# Patient Record
Sex: MALE | Race: WHITE | NOT HISPANIC OR LATINO | Employment: FULL TIME | URBAN - METROPOLITAN AREA
[De-identification: names, ages, dates, MRNs, and addresses within clinical notes are randomized per-mention and may not be internally consistent; named-entity substitution may affect disease eponyms.]

---

## 2017-04-12 ENCOUNTER — OFFICE VISIT - RIVER FALLS (OUTPATIENT)
Dept: FAMILY MEDICINE | Facility: CLINIC | Age: 37
End: 2017-04-12

## 2017-04-12 ASSESSMENT — MIFFLIN-ST. JEOR: SCORE: 1700.53

## 2017-05-20 ENCOUNTER — OFFICE VISIT - RIVER FALLS (OUTPATIENT)
Dept: FAMILY MEDICINE | Facility: CLINIC | Age: 37
End: 2017-05-20

## 2017-05-20 ASSESSMENT — MIFFLIN-ST. JEOR: SCORE: 1686.92

## 2017-10-17 ENCOUNTER — AMBULATORY - RIVER FALLS (OUTPATIENT)
Dept: FAMILY MEDICINE | Facility: CLINIC | Age: 37
End: 2017-10-17

## 2018-01-15 ENCOUNTER — OFFICE VISIT - RIVER FALLS (OUTPATIENT)
Dept: FAMILY MEDICINE | Facility: CLINIC | Age: 38
End: 2018-01-15

## 2019-10-02 ENCOUNTER — OFFICE VISIT - RIVER FALLS (OUTPATIENT)
Dept: FAMILY MEDICINE | Facility: CLINIC | Age: 39
End: 2019-10-02

## 2019-10-02 ASSESSMENT — MIFFLIN-ST. JEOR: SCORE: 1691.46

## 2019-10-15 LAB
ALBUMIN SERPL-MCNC: 4.8 G/DL
ALP SERPL-CCNC: 84 UNIT/L
ALT SERPL W P-5'-P-CCNC: 13 UNIT/L
AST SERPL W P-5'-P-CCNC: 20 UNIT/L
BILIRUB SERPL-MCNC: 0.86 MG/DL
BUN SERPL-MCNC: 14 MG/DL
CHOLEST SERPL-MCNC: 144 MG/DL
CREAT SERPL-MCNC: 1.18 MG/DL
CREAT UR-MCNC: 440 MG/DL
GGT SERPL-CCNC: 14 IU/L
GLOBULIN: 2.2 G/DL
GLUCOSE BLD-MCNC: 71 MG/DL
GLUCOSE, UR.: NEGATIVE
HBA1C MFR BLD: 5 %
HDLC SERPL-MCNC: 51.1 MG/DL
HGB UR QL STRIP: NEGATIVE
LDLC SERPL CALC-MCNC: 81 MG/DL
Lab: NEGATIVE
PROT SERPL-MCNC: 7 GM/DL
PROT/CREAT UR: 38 MG/G{CREAT}
SP GR UR STRIP: 1.07
TRIGL SERPL-MCNC: 57 MG/DL

## 2019-10-22 ENCOUNTER — COMMUNICATION - RIVER FALLS (OUTPATIENT)
Dept: FAMILY MEDICINE | Facility: CLINIC | Age: 39
End: 2019-10-22

## 2019-11-30 ENCOUNTER — OFFICE VISIT - RIVER FALLS (OUTPATIENT)
Dept: FAMILY MEDICINE | Facility: CLINIC | Age: 39
End: 2019-11-30

## 2019-11-30 ASSESSMENT — MIFFLIN-ST. JEOR: SCORE: 1685.11

## 2020-03-12 ENCOUNTER — COMMUNICATION - RIVER FALLS (OUTPATIENT)
Dept: FAMILY MEDICINE | Facility: CLINIC | Age: 40
End: 2020-03-12

## 2020-07-06 ENCOUNTER — OFFICE VISIT - RIVER FALLS (OUTPATIENT)
Dept: FAMILY MEDICINE | Facility: CLINIC | Age: 40
End: 2020-07-06

## 2020-07-06 ENCOUNTER — AMBULATORY - RIVER FALLS (OUTPATIENT)
Dept: FAMILY MEDICINE | Facility: CLINIC | Age: 40
End: 2020-07-06

## 2020-07-16 ENCOUNTER — COMMUNICATION - RIVER FALLS (OUTPATIENT)
Dept: FAMILY MEDICINE | Facility: CLINIC | Age: 40
End: 2020-07-16

## 2020-07-16 LAB — SARS-COV-2 RNA SPEC QL NAA+PROBE: NOT DETECTED

## 2020-11-25 ENCOUNTER — AMBULATORY - RIVER FALLS (OUTPATIENT)
Dept: FAMILY MEDICINE | Facility: CLINIC | Age: 40
End: 2020-11-25

## 2021-09-16 ENCOUNTER — COMMUNICATION - RIVER FALLS (OUTPATIENT)
Dept: FAMILY MEDICINE | Facility: CLINIC | Age: 41
End: 2021-09-16

## 2021-12-23 ENCOUNTER — AMBULATORY - RIVER FALLS (OUTPATIENT)
Dept: FAMILY MEDICINE | Facility: CLINIC | Age: 41
End: 2021-12-23

## 2022-02-11 VITALS
WEIGHT: 175.8 LBS | TEMPERATURE: 98 F | HEIGHT: 68 IN | SYSTOLIC BLOOD PRESSURE: 134 MMHG | SYSTOLIC BLOOD PRESSURE: 128 MMHG | TEMPERATURE: 97.4 F | HEART RATE: 56 BPM | DIASTOLIC BLOOD PRESSURE: 78 MMHG | HEART RATE: 62 BPM | DIASTOLIC BLOOD PRESSURE: 72 MMHG | BODY MASS INDEX: 26.64 KG/M2 | HEIGHT: 68 IN | BODY MASS INDEX: 27.1 KG/M2 | WEIGHT: 178.8 LBS

## 2022-02-11 VITALS
OXYGEN SATURATION: 98 % | DIASTOLIC BLOOD PRESSURE: 62 MMHG | TEMPERATURE: 97.9 F | HEART RATE: 64 BPM | WEIGHT: 175.4 LBS | SYSTOLIC BLOOD PRESSURE: 112 MMHG | HEIGHT: 68 IN | BODY MASS INDEX: 26.58 KG/M2

## 2022-02-11 VITALS
DIASTOLIC BLOOD PRESSURE: 81 MMHG | WEIGHT: 176.8 LBS | HEIGHT: 68 IN | SYSTOLIC BLOOD PRESSURE: 144 MMHG | BODY MASS INDEX: 26.8 KG/M2 | HEART RATE: 61 BPM | TEMPERATURE: 97.4 F

## 2022-02-11 VITALS
BODY MASS INDEX: 26.79 KG/M2 | TEMPERATURE: 97.8 F | SYSTOLIC BLOOD PRESSURE: 134 MMHG | WEIGHT: 176.2 LBS | DIASTOLIC BLOOD PRESSURE: 86 MMHG | HEART RATE: 66 BPM

## 2022-02-16 NOTE — PROGRESS NOTES
Chief Complaint    c/o sinus pressure/nasal congestion, cough with mucus  History of Present Illness      Patient is here with several week history of sinus pressure, nasal congestion, mild cough, and fatigue.  He denies fever, chills, sweats.  Review of Systems          ROS reviewed an negative except for symptoms noted in HPI.            Physical Exam   Vitals & Measurements    T: 97.9   F (Tympanic)  HR: 64(Peripheral)  BP: 112/62  SpO2: 98%     HT: 68 in  WT: 175.4 lb  BMI: 26.67           General:  Alert and oriented, No acute distress.            Eye:  Normal conjunctiva.            HENT:  Normocephalic, Tympanic membranes are clear, Oral mucosa is moist, No pharyngeal erythema.                 Sinus: bilateral maxillary tenderness               Throat: Pharynx ( posterior drainage ).            Neck:  Supple, Non-tender, No lymphadenopathy.            Respiratory:  Lungs are clear to auscultation, Respirations are non-labored.            Cardiovascular:  Normal rate, Regular rhythm.            Integumentary:  Warm, Dry.            Psychiatric:  Cooperative, Appropriate mood & affect.   Assessment/Plan       1. Acute maxillary sinusitis, unspecified (J01.00)         treat with doxycycline for 10 days        Analgesics and antipyretics as needed, symptomatic care, and follow up if not improving           Patient Information     Name:REJI ROMERO      Address:      [address] [id number]     Sex:Male     YOB: 1980     [phone]     Emergency Contact:NARCISA ROMERO     MRN:[id number]     FIN:[id number]     Location:[facility]     Date of Service:11/30/2019      Primary Care Physician:       Cullen Johnson MD, [phone]      Attending Physician:       Willian Bauer MD, [phone]  Problem List/Past Medical History    Ongoing     Epistaxis       Comments: Occasional epistaxis during the winter months.    Historical     Allergic eye  reaction       Comments: Uncertain cause.     Benign positional vertigo     Brain concussion       Comments: Patient slipped on ice, hit the back of his head. Suffered concussion/closed head injury. Also sprained neck.     Fracture, tibia       Comments: Nondisplaced fracture of the distal right tibia.     Right wrist sprain       Comments: Softball injury. Right wrist.  Procedure/Surgical History     Oral surgery      Comments: He was missing a couple of teeth congenitally.. Teeth implants..  Medications    doxycycline monohydrate 100 mg oral capsule, 100 mg= 1 cap(s), Oral, bid  Allergies    penicillins (hives)  Social History    Smoking Status - 11/30/2019     Never smoker     Alcohol      Current, Beer (12 oz), 1-2 times per month, 2 drinks/episode average. 3 drinks/episode maximum. Ready to change: No., 10/04/2019     Employment/School      Employed, Work/School description: Banker. Highest education level: University degree(s)., 10/04/2019     Exercise      Exercise frequency: 1-2 times/week. Exercise type: Sports, Yard Activities., 10/04/2019     Home/Environment      Marital status: . Spouse/Partner name: Mariama Fischer. Living situation: Home/Independent. Injuries/Abuse/Neglect in household: No. Feels unsafe at home: No. Family/Friends available for support: Yes., 10/04/2019     Nutrition/Health      Type of diet: Regular. Wants to lose weight: No. Sleeping concerns: No. Feels highly stressed: No., 10/04/2019     Sexual      Sexually active: Yes. Identifies as male, Sexual orientation: Straight or heterosexual. History of STD: No. Contraceptive Use Details: wife has IUD. History of sexual abuse: No., 10/04/2019     Substance Abuse      Never, 08/26/2014     Tobacco      Never, 08/26/2014  Family History    Hypertension: Father.    Mother: History is negative    Brother: History is negative  Immunizations      Vaccine Date Status Comments      influenza virus vaccine, inactivated 10/02/2019 Given       influenza virus vaccine, inactivated 10/17/2017 Given      influenza virus vaccine, inactivated 11/30/2016 Given      tetanus/diphth/pertuss (Tdap) adult/adol 08/26/2014 Given      Td 12/09/2004 Recorded      Hep B 06/28/2000 Recorded      Hep B 11/16/1999 Recorded      Hep B 09/29/1999 Recorded      MMR (measles/mumps/rubella) 04/27/1993 Recorded

## 2022-02-16 NOTE — TELEPHONE ENCOUNTER
Entered by Dana Barrientos CMA on March 12, 2020 2:39:56 PM CDT  Called Curtis back let him know we can fax, mail, or leave at  for immunization record and  forms to  provider. Called back let us know which works best for them.      ---------------------  From: CURTIS FISCHER  To: [facility]  Sent: 03/12/2020 02:20 p.m. CDT  Subject: Child Health Records  I am trying to track down some vaccination information for my children, Simon and Sarthak Fischer. Is that something that can be added to my portal for viewing, or do I need to contact someone at [facility] to get the information?---------------------  From: Dana Barrientos CMA (Phone Messages Pool ([id number]_[facility]))   To: [facility] Message Pool ([id number]_[place] [facility] [place]);     Sent: 3/12/2020 4:13:55 PM CDT  Subject: FW: Child Health Records---------------------  From: Fay Schmitt ([facility] Message Pool ([id number]_[facility]))   To: CURTIS FISCHER    Sent: 3/13/2020 4:50:35 PM CDT  Subject: FW: Child Health Records     Briana Acevedo,     I completed  forms and printed vaccination records for both girls.   It's at the  ready for . We are open all weekend from 8-5p if you would like to stop by.     I hope you have a great weekend!    Albaro ROYAL,

## 2022-02-16 NOTE — TELEPHONE ENCOUNTER
---------------------  From: Caity Wang   Sent: 7/6/2020 3:36:10 PM CDT  Subject: Curbside Testing     Patient was in for curbside testing. Per . O2 sat=98%. Specimen sent to [facility] lab. Forms faxed to [facility].

## 2022-02-16 NOTE — TELEPHONE ENCOUNTER
---------------------  From: Trinity Fragoso ([facility] [place] Message Pool ([id number]))   To: [facility] [place] Message [facility] ([id number]_[place] [facility] [place]);     Sent: 10/22/2019 3:52:17 PM CDT  Subject: Result: Ultrasound     Left message for patient to call back at: 3:50pm regarding recent ultrasound  Per TFS, Ultrasound shows a small benign cyst.  No follow up is needed.Patient had left a portal message @ [phone] that he received a message about test results. Patient notified via portal of this message.

## 2022-02-16 NOTE — TELEPHONE ENCOUNTER
---------------------  From: Siena Gonzales CMA   To: Appointment Pool ([id number]_[place] [facility] [place]);     Sent: 7/6/2020 10:42:14 AM CDT !  Subject: General Message     Please set pt up for covid 19 testing todaySCHEDULED

## 2022-02-16 NOTE — PROGRESS NOTES
Patient:   REJI ROMERO            MRN: [id number]            FIN: [id number]               Age:   37 years     Sex:  Male     :  1980   Associated Diagnoses:   None   Author:   Cullen Johnson MD      Chief Complaint   1/15/2018 1:02 PM CST    On/off sinus pressure and teeth pain.  Fever, upset stomach, congestion over the weekend.        History of Present Illness             The patient presents with facial pain.  The location is both sides of the face.  The duration is 28  days.  There were exacerbating factors including coughing.  The symptom occurs constantly.  The course is worsening.               The patient presents with nasal congestion.  The location is both sides.     It is described as a moderate amount and green in color.        Review of Systems   Constitutional:  No fever, No chills, No sweats.    Eye:  Negative.    Ear/Nose/Mouth/Throat:  Nasal congestion, Sinus pain, Sore throat.         Nasal discharge: Large amount, Green, Yellow.         Nasal obstruction: Bilateral.    Respiratory:  Cough.       Health Status   Allergies:    Allergic Reactions (Selected)  Severity Not Documented  Penicillins (Hives)   Medications:  (Selected)   Prescriptions  Prescribed  Flonase 50 mcg/inh nasal spray: 2 spray(s), Nasal, Daily, # 1 EA, 6 Refill(s), Type: Maintenance, Pharmacy: [facility], 2 spray(s) nasal daily  Levaquin 500 mg oral tablet: 1 tab(s) ( 500 mg ), PO, q24hr, # 10 tab(s), 0 Refill(s), Type: Maintenance, Pharmacy: [facility], 1 tab(s) po q 24 hrs,x10 day(s)  Documented Medications  Documented  ibuprofen: ( 200 mg ), po, prn, 0 Refill(s), Type: Maintenance   Problem list:    All Problems  Inactive: Epistaxis / SNOMED CT 3873958688  Resolved: Benign positional vertigo / SNOMED CT 197417430  Resolved: Brain concussion / SNOMED CT 764171826  Resolved: Fracture, tibia / SNOMED CT 68280273  Resolved: Allergic eye reaction / SNOMED CT 789060007  Resolved: Right wrist sprain / SNOMED CT  606122115      Histories   Past Medical History:    Resolved  Brain concussion (013492132): Onset on 1/14/2010 at 29 years.  Resolved.  Comments:  7/16/2012 CDT 2:50 PM CDT - Candy Cesar  Patient slipped on ice, hit the back of his head.  Suffered concussion/closed head injury.  Also sprained neck.  Right wrist sprain (483972806): Onset on 5/1/2008 at 27 years.  Resolved.  Comments:  7/16/2012 CDT 2:52 PM CDT - Candy Cesar  Softball injury.  Right wrist.  Benign positional vertigo (962694502): Onset on 8/23/2005 at 25 years.  Resolved.  Allergic eye reaction (376135985): Onset on 11/3/2003 at 23 years.  Resolved.  Comments:  7/16/2012 CDT 2:57 PM CDT Candy Francis  Uncertain cause.  Fracture, tibia (93270648): Onset on 6/30/1998 at 17 years.  Resolved.  Comments:  7/16/2012 CDT 2:45 PM CDT - Candy Cesar  Nondisplaced fracture of the distal right tibia.   Family History:    Hypertension  Father (Chad)     Procedure history:    Oral surgery (SNOMED CT 9470363618).  Comments:  7/16/2012 2:55 PM - Candy Cesar  He was missing a couple of teeth congenitally.    7/16/2012 2:47 PM - Candy Cesar  Teeth implants.   Social History:        Alcohol Assessment            Current, 0-2 times per week, 1 drinks/episode average.  2 drinks/episode maximum.      Tobacco Assessment            Never      Substance Abuse Assessment            Never      Employment and Education Assessment            Employed, Work/School description: Banker.      Home and Environment Assessment            Marital status: .  Spouse/Partner name: Mariama Valadeztz.      Nutrition and Health Assessment            Type of diet: Regular.      Exercise and Physical Activity Assessment            Exercise frequency: 2-3 times/week.  Exercise type: Walking, Sports.      Sexual Assessment            Sexually active: Yes.  Sexual orientation: Heterosexual.  Contraceptive Use Details: Condoms.  ,        Alcohol Assessment            Current, 0-2 times per week,  1 drinks/episode average.  2 drinks/episode maximum.      Tobacco Assessment            Never      Substance Abuse Assessment            Never      Employment and Education Assessment            Employed, Work/School description: Banker.      Home and Environment Assessment            Marital status: .  Spouse/Partner name: Mariama Fischer.      Nutrition and Health Assessment            Type of diet: Regular.      Exercise and Physical Activity Assessment            Exercise frequency: 2-3 times/week.  Exercise type: Walking, Sports.      Sexual Assessment            Sexually active: Yes.  Sexual orientation: Heterosexual.  Contraceptive Use Details: Condoms.        Physical Examination   Vital Signs   1/15/2018 1:02 PM CST Temperature Tympanic 97.8 DegF  LOW    Peripheral Pulse Rate 66 bpm    HR Method Electronic    Systolic Blood Pressure 134 mmHg  HI    Diastolic Blood Pressure 86 mmHg  HI    Mean Arterial Pressure 102 mmHg    BP Method Electronic      Measurements from flowsheet : Measurements   1/15/2018 1:02 PM CST    Weight Measured - Standard                176.2 lb     General:  Alert and oriented, No acute distress.    Eye:  Normal conjunctiva.    HENT:  Normocephalic, Tympanic membranes are clear.         Ear: Both ears, Within normal limits.         Nose: Both nostrils, Patent, Discharge ( Moderate amount, Green ), Turbinates ( Boggy, Erythematous ).         Throat: Tonsils ( Erythematous ), Pharynx ( Erythematous ).    Neck:  Supple, Non-tender, No lymphadenopathy.    Respiratory:  Lungs are clear to auscultation.       Impression and Plan   Diagnosis   Course:  Worsening.    Orders     Orders (Selected)   Outpatient Orders  Ordered  Influenza A and B Antigen (Request): Priority: Urgent, Cough  Physical Therapy (Request): Instructions: Evaluate and Treat  Physical Therapy (Request): Instructions: eval and treat, Chronic left shoulder pain  Prescriptions  Prescribed  Flonase 50 mcg/inh nasal  spray: 2 spray(s), Nasal, Daily, # 1 EA, 6 Refill(s), Type: Maintenance, Pharmacy: [facility], 2 spray(s) nasal daily  Levaquin 500 mg oral tablet: 1 tab(s) ( 500 mg ), PO, q24hr, # 10 tab(s), 0 Refill(s), Type: Maintenance, Pharmacy: [facility], 1 tab(s) po q 24 hrs,x10 day(s)  Documented Medications  Documented  ibuprofen: ( 200 mg ), po, prn, 0 Refill(s), Type: Maintenance.     Plan:       Follow-up: With Primary Care Provider, As needed or sooner if symptoms worsen.    Counseled:  Patient, Regarding diagnosis, Regarding treatment, Regarding medications.    Patient Instructions:  Launch follow-up (if licensed).

## 2022-02-16 NOTE — NURSING NOTE
Comprehensive Intake Entered On:  7/6/2020 10:18 AM CDT    Performed On:  7/6/2020 10:16 AM CDT by Siena Gonzales CMA               Summary   Chief Complaint :   c/o sneezing, scratchy nose and throat with low grade fever. Symptoms started yesterday. deneis body aches fatigue, or direct contact. verbal consent given for video visit   Menstrual Status :   N/A   Race :      Languages :   English   Ethnicity :   Not  or    Siena Gonzales CMA - 7/6/2020 10:16 AM CDT   Health Status   Allergies Verified? :   Yes   Medication History Verified? :   Yes   Pre-Visit Planning Status :   Not completed   Tobacco Use? :   Never smoker   Siena Gonzales CMA - 7/6/2020 10:16 AM CDT   Consents   Consent for Immunization Exchange :   Consent Granted   Consent for Immunizations to Providers :   Consent Granted   Siena Gonzales CMA - 7/6/2020 10:16 AM CDT   Meds / Allergies   (As Of: 7/6/2020 10:18:04 AM CDT)   Allergies (Active)   penicillins  Estimated Onset Date:   Unspecified ; Reactions:   hives ; Created By:   Marry Willingham CMA; Reaction Status:   Active ; Category:   Drug ; Substance:   penicillins ; Type:   Allergy ; Updated By:   Marry Willingham CMA; Reviewed Date:   11/30/2019 8:53 AM CST        Medication List   (As Of: 7/6/2020 10:18:04 AM CDT)   Prescription/Discharge Order    doxycycline  :   doxycycline ; Status:   Processing ; Ordered As Mnemonic:   doxycycline monohydrate 100 mg oral capsule ; Ordering Provider:   Willian Bauer MD; Action Display:   Complete ; Catalog Code:   doxycycline ; Order Dt/Tm:   7/6/2020 10:17:41 AM CDT            ID Risk Screen   Recent Travel History :   No recent travel   Family Member Travel History :   No recent travel   Other Exposure to Infectious Disease :   Unknown   Siena Gonzales CMA - 7/6/2020 10:16 AM CDT

## 2022-02-16 NOTE — NURSING NOTE
Comprehensive Intake Entered On:  10/2/2019 4:08 PM CDT    Performed On:  10/2/2019 4:07 PM CDT by Trinity Fragoso               Summary   Chief Complaint :   Px   Menstrual Status :   N/A   Weight Measured :   176.8 lb(Converted to: 176 lb 13 oz, 80.20 kg)    Height Measured :   68 in(Converted to: 5 ft 8 in, 172.72 cm)    Body Mass Index :   26.88 kg/m2 (HI)    Body Surface Area :   1.96 m2   Systolic Blood Pressure :   144 mmHg (HI)    Diastolic Blood Pressure :   81 mmHg (HI)    Mean Arterial Pressure :   102 mmHg   Peripheral Pulse Rate :   61 bpm   BP Method :   Electronic   HR Method :   Electronic   Temperature Tympanic :   97.4 DegF(Converted to: 36.3 DegC)  (LOW)    Race :      Languages :   English   Ethnicity :   Not  or    Trinity Fragoso - 10/2/2019 4:07 PM CDT   Health Status   Allergies Verified? :   Yes   Medication History Verified? :   Yes   Pre-Visit Planning Status :   Completed   Tobacco Use? :   Never smoker   Trinity Fragoso - 10/2/2019 4:07 PM CDT   Meds / Allergies   (As Of: 10/2/2019 4:08:29 PM CDT)   Allergies (Active)   penicillins  Estimated Onset Date:   Unspecified ; Reactions:   hives ; Created By:   Marry Willingham CMA; Reaction Status:   Active ; Category:   Drug ; Substance:   penicillins ; Type:   Allergy ; Updated By:   Marry Willingham CMA; Reviewed Date:   5/20/2017 10:04 AM CDT        Medication List   (As Of: 10/2/2019 4:08:29 PM CDT)   Prescription/Discharge Order    levoFLOXacin  :   levoFLOXacin ; Status:   Completed ; Ordered As Mnemonic:   Levaquin 500 mg oral tablet ; Simple Display Line:   500 mg, 1 tab(s), PO, q24hr, for 10 day(s), 10 tab(s), 0 Refill(s) ; Ordering Provider:   Cullen Johnson MD; Catalog Code:   levoFLOXacin ; Order Dt/Tm:   2/27/2018 12:07:44 PM          fluticasone nasal  :   fluticasone nasal ; Status:   Processing ; Ordered As Mnemonic:   Flonase 50 mcg/inh nasal spray ; Ordering Provider:   Cullen Johnson MD;  Action Display:   Complete ; Catalog Code:   fluticasone nasal ; Order Dt/Tm:   10/2/2019 4:07:23 PM            Home Meds    ibuprofen  :   ibuprofen ; Status:   Processing ; Ordered As Mnemonic:   ibuprofen ; Action Display:   Complete ; Catalog Code:   ibuprofen ; Order Dt/Tm:   10/2/2019 4:07:23 PM

## 2022-02-16 NOTE — PROGRESS NOTES
Patient:   REJI ROMERO            MRN: [id number]            FIN: [id number]               Age:   39 years     Sex:  Male     :  1980   Associated Diagnoses:   Well adult exam; Lump in testis   Author:   Cullen Johnson MD      Visit Information      Date of Service: 10/02/2019 04:00 pm  Performing Location: [facility]  Encounter#: [id number]      Primary Care Provider (PCP):  Cullen Johnson MD    NPI# [id number]      Referring Provider:  Cullen Johnson MD# [id number]      Chief Complaint   10/2/2019 4:07 PM CDT    Px     Routine Health Care Visit      History of Present Illness   Doing well Sees chiropractor for neck issues  bp usually wnl  notice lump on testicle             The patient presents for a general exam.  The patient's general health status is described as good.  The patient's diet is described as balanced.  Exercise: occasional.  Additional pertinent history: occasional caffeine use and tobacco use none.        Review of Systems   Constitutional:  Negative.    Eye:  Negative.    Ear/Nose/Mouth/Throat:  Negative.    Respiratory:  Negative.    Cardiovascular:  Negative.    Gastrointestinal:  Negative.    Genitourinary:  Negative.    Hematology/Lymphatics:  Negative.    Endocrine:  Negative.    Immunologic:  Negative.    Musculoskeletal:  Negative.    Integumentary:  Negative.    Neurologic:  Negative.    Psychiatric:  Negative.    All other systems reviewed and negative      Health Status   Allergies:    Allergic Reactions (Selected)  Severity Not Documented  Penicillins (Hives)   Medications:  (Selected)   ,    Medications          No medications documented     Problem list:    All Problems  Inactive: Epistaxis / SNOMED CT 2513117834  Resolved: Benign positional vertigo / SNOMED CT 499798748  Resolved: Brain concussion / SNOMED CT 338394296  Resolved: Fracture, tibia / SNOMED CT 19156326  Resolved: Allergic eye reaction / SNOMED CT 686800685  Resolved: Right  wrist sprain / SNOMED CT 838967264      Histories   Past Medical History:    Resolved  Brain concussion (791277215): Onset on 1/14/2010 at 29 years.  Resolved.  Comments:  7/16/2012 CDT 2:50 PM Candy Vallecillo  Patient slipped on ice, hit the back of his head.  Suffered concussion/closed head injury.  Also sprained neck.  Right wrist sprain (050396730): Onset on 5/1/2008 at 27 years.  Resolved.  Comments:  7/16/2012 CDT 2:52 PM [facility] Candy Francis  Softball injury.  Right wrist.  Benign positional vertigo (018747672): Onset on 8/23/2005 at 25 years.  Resolved.  Allergic eye reaction (857559946): Onset on 11/3/2003 at 23 years.  Resolved.  Comments:  7/16/2012 CDT 2:57 PM Candy Vallecillo  Uncertain cause.  Fracture, tibia (36371002): Onset on 6/30/1998 at 17 years.  Resolved.  Comments:  7/16/2012 CDT 2:45 PM Candy Vallecillo  Nondisplaced fracture of the distal right tibia.   Family History:    Mother: Deepti      History is negative.  Father: Chad    Hypertension  Brother    History is negative.     Procedure history:    Oral surgery (SNOMED CT 8885620052).  Comments:  7/16/2012 2:55 PM Candy Vallecillo  He was missing a couple of teeth congenitally.    7/16/2012 2:47 PM Candy Vallecillo  Teeth implants.   Social History:        Alcohol Assessment            Current, 0-2 times per week, 1 drinks/episode average.  2 drinks/episode maximum.      Tobacco Assessment            Never      Substance Abuse Assessment            Never      Employment and Education Assessment            Employed, Work/School description: Banker.      Home and Environment Assessment            Marital status: .  Spouse/Partner name: Mariama Amado.      Nutrition and Health Assessment            Type of diet: Regular.      Exercise and Physical Activity Assessment            Exercise frequency: 2-3 times/week.  Exercise type: Walking, Sports.      Sexual Assessment            Sexually active: Yes.  Sexual orientation:  Heterosexual.  Contraceptive Use Details: Condoms.        Physical Examination   Vital Signs   10/2/2019 4:07 PM CDT Temperature Tympanic 97.4 DegF  LOW    Peripheral Pulse Rate 61 bpm    HR Method Electronic    Systolic Blood Pressure 144 mmHg  HI    Diastolic Blood Pressure 81 mmHg  HI    Mean Arterial Pressure 102 mmHg    BP Method Electronic      Measurements from flowsheet : Measurements   10/2/2019 4:07 PM CDT Height Measured - Standard 68 in    Weight Measured - Standard 176.8 lb    BSA 1.96 m2    Body Mass Index 26.88 kg/m2  HI      General:  Alert and oriented.    Eye:  Pupils are equal, round and reactive to light, Normal conjunctiva.    HENT:  Normocephalic, Tympanic membranes are clear, Oral mucosa is moist.    Neck:  Supple, Non-tender, No lymphadenopathy, No thyromegaly.    Respiratory:  Breath sounds are equal, Symmetrical chest wall expansion.         Respirations: Are within normal limits.         Pattern: Regular.         Breath sounds: Bilateral, Within normal limits.    Cardiovascular:  Normal rate, Regular rhythm, No murmur, Good pulses equal in all extremities, Normal peripheral perfusion, No edema.    Gastrointestinal:  Soft, Non-tender, Non-distended, Normal bowel sounds.    Genitourinary:       Testes: Within normal limits.    Musculoskeletal:  No tenderness, No swelling.    Integumentary:  Warm, Dry.    Neurologic:  No focal deficits.    Cognition and Speech:  Oriented, Speech clear and coherent.    Psychiatric:  Appropriate mood & affect, Normal judgment.       Health Maintenance      Recommendations     Pending (in the next year)        OverDue           Alcohol Misuse Screen (Male) due  04/12/18  and every 1  year(s)           Depression Screen (Male) due  04/12/18  and every 1  year(s)           Influenza Vaccine due  09/01/19  and every 1  year(s)        Due            HIV Screen (if sexually active) (Male) due  10/02/19  and every 1  year(s)           Lipid Disorders Screen (Male) due   10/02/19  and every 1  year(s)           STD Counseling (if sexually active) (Male) due  10/02/19  and every 1  year(s)           Syphilis Screen (if sexually active) (Male) due  10/02/19  and every 1  year(s)           Type 2 Diabetes Mellitus Screen (Male) due  10/02/19  Variable frequency     Satisfied (in the past 1 year)        Satisfied            Body Mass Index Check (Male) on  10/02/19.           High Blood Pressure Screen (Male) on  10/02/19.           Tobacco Use Screen (Male) on  10/02/19.          Impression and Plan   Diagnosis     Well adult exam (VRQ25-FF Z00.00).     Lump in testis (BJJ13-OH N50.9).     Course:  Progressing as expected.    Plan:  hcm reviewed, htn reviewed  us of testicle  1 yr fu.    Patient Instructions:       Counseled: Patient, Diet, Activity, Verbalized understanding.    Counseled:  Patient, Routine exercise and healthy weight maintenance discussed.    Patient Instructions:  Return to clinic in one year for next routine health visit, or sooner if problems or concerns.

## 2022-02-16 NOTE — TELEPHONE ENCOUNTER
---------------------  From: Marco Barrera MD   To: REJI ROMERO    Sent: 7/16/2020 8:41:19 AM CDT  Subject: Patient Message - Results Notification        your results are negative    Results:  Date Result Name Value Ref Range   7/6/2020 11:35 AM Coronavirus SARS-CoV-2 (COVID-19) NOT DETECTED (NOT DETECTED - )

## 2022-02-16 NOTE — PROGRESS NOTES
Patient:   REJI ROMERO            MRN: [id number]            FIN: [id number]               Age:   36 years     Sex:  Male     :  1980   Associated Diagnoses:   Annual exam; Left thumb sprain   Author:   Cullen Johnson MD      Visit Information      Date of Service: 2017 03:58 pm  Performing Location: [facility]  Encounter#: [id number]      Primary Care Provider (PCP):  Cullen Johnson MD    NPI# [id number]      Referring Provider:  No referring provider recorded for selected visit.      Chief Complaint   2017 4:02 PM CDT    Px   Routine Health Care Visit      History of Present Illness   Doing well no concerns             The patient presents for a general exam, chief complaint and symptoms as noted above confirmed with patient .  The patient's general health status is described as good.  Exercise: routine.     sprined thumb bball last wiinter still sore      Review of Systems   Constitutional:  Negative.    Eye:  Negative.    Ear/Nose/Mouth/Throat:  Negative.    Respiratory:  Negative.    Cardiovascular:  Negative.    Gastrointestinal:  Negative.    Genitourinary:  Negative.    Hematology/Lymphatics:  Negative.    Endocrine:  Negative.    Immunologic:  Negative.    Musculoskeletal:  Negative except as documented in history of present illness.    Integumentary:  Negative except as documented in history of present illness.    Neurologic:  Negative.    Psychiatric:  Negative.    All other systems reviewed and negative      Health Status   Allergies:    Allergic Reactions (Selected)  No known allergies   Medications:  (Selected)   Documented Medications  Documented  ibuprofen: ( 200 mg ), po, prn, 0 Refill(s), Type: Maintenance   Problem list:    All Problems  Inactive: EPISTAXIS / ICD-9-.7  Resolved: Fracture, tibia / ICD-9-.80  Resolved: Brain Concussion / ICD-9-.9  Resolved: Right wrist sprain / ICD-9-.00  Resolved: Benign Positional Vertigo /  ICD-9-.11  Resolved: Allergic eye reaction / ICD-9-.99      Histories   Past Medical History:    Resolved  Brain Concussion (850.9): Onset on 1/14/2010 at 29 years.  Resolved.  Comments:  7/16/2012 CDT 2:50 PM CDT - Candy Cesar  Patient slipped on ice, hit the back of his head.  Suffered concussion/closed head injury.  Also sprained neck.  Right wrist sprain (842.00): Onset on 5/1/2008 at 27 years.  Resolved.  Comments:  7/16/2012 CDT 2:52 PM CDT - Candy Cesar  Softball injury.  Right wrist.  Benign Positional Vertigo (386.11): Onset on 8/23/2005 at 25 years.  Resolved.  Allergic eye reaction (379.99): Onset on 11/3/2003 at 23 years.  Resolved.  Comments:  7/16/2012 CDT 2:57 PM CDT - Candy Cesar  Uncertain cause.  Fracture, tibia (823.80): Onset on 6/30/1998 at 17 years.  Resolved.  Comments:  7/16/2012 CDT 2:45 PM CDT - Candy Cesar  Nondisplaced fracture of the distal right tibia.   Family History:    Mother: Deepti      History is negative.  Father: Chad    Hypertension  Brother    History is negative.     Procedure history:    Oral surgery (SNOMED CT 3645796961).  Comments:  7/16/2012 2:55 PM - Candy Cesar  He was missing a couple of teeth congenitally.    7/16/2012 2:47 PM - Candy Cesar  Teeth implants.   Social History:        Alcohol Assessment            Current                     Comments:                      07/16/2012 - Candy Cesar                     Occasional      Tobacco Assessment            Never      Substance Abuse Assessment            Never      Employment and Education Assessment            Employed, Work/School description: Banker.      Home and Environment Assessment            Marital status: .  Spouse/Partner name: Mariama Valadeztz.      Nutrition and Health Assessment            Type of diet: Regular.      Exercise and Physical Activity Assessment            Exercise frequency: 2-3 times/week.  Exercise type: Walking, Sports.      Sexual Assessment            Sexually  active: Yes.  Sexual orientation: Heterosexual.  Contraceptive Use Details: Condoms.        Physical Examination   Vital Signs   4/12/2017 4:02 PM CDT Temperature Tympanic 98.0 DegF    Peripheral Pulse Rate 62 bpm    Systolic Blood Pressure 134 mmHg    Diastolic Blood Pressure 78 mmHg    Mean Arterial Pressure 97 mmHg      Measurements from flowsheet : Measurements   4/12/2017 4:02 PM CDT Height Measured - Standard 68 in    Weight Measured - Standard 178.8 lb    BSA 1.97 m2    Body Mass Index 27.18 kg/m2      General:  Alert and oriented.    Eye:  Pupils are equal, round and reactive to light, Normal conjunctiva.    HENT:  Normocephalic, Tympanic membranes are clear, Oral mucosa is moist.    Neck:  Supple, Non-tender, No lymphadenopathy, No thyromegaly.    Respiratory:  Breath sounds are equal, Symmetrical chest wall expansion.         Respirations: Are within normal limits.         Pattern: Regular.         Breath sounds: Bilateral, Within normal limits.    Cardiovascular:  Normal rate, Regular rhythm, No murmur, Good pulses equal in all extremities, Normal peripheral perfusion, No edema.    Breast:  No mass.         Lymph nodes: Within normal limits.    Gastrointestinal:  Soft, Non-tender, Non-distended, Normal bowel sounds.    Musculoskeletal:       Upper extremity exam: Fingers ( Left, First finger, Metacarpal phalangeal, Tenderness, Normal range of motion ).    Integumentary:  Warm, Dry.    Neurologic:  No focal deficits.    Cognition and Speech:  Oriented, Speech clear and coherent.    Psychiatric:  Appropriate mood & affect, Normal judgment.       Health Maintenance      Recommendations     Pending (in the next year)        OverDue           Alcohol Misuse Screen (Male) due  08/26/15  and every 1  year(s)           Depression Screen (Male) due  08/26/15  and every 1  year(s)        Due            HIV Screen (if sexually active) (Male) due  04/12/17  and every 1  year(s)           STD Counseling (if sexually  active) (Male) due  04/12/17  and every 1  year(s)           Syphilis Screen (if sexually active) (Male) due  04/12/17  and every 1  year(s)     Satisfied (in the past 1 year)        Satisfied            Body Mass Index Check (Male) on  04/12/17.           High Blood Pressure Screen (Male) on  04/12/17.           Tobacco Use Screen (Male) on  04/12/17.        Review / Management   Radiology results   Reveals no acute disease process      Impression and Plan   Diagnosis     Annual exam (YLC19-OW Z00.00).     Left thumb sprain (COW51-RO S63.602A).     Plan:  declined splint.    Patient Instructions:       Counseled: Patient, Diet, Activity, Verbalized understanding.    Counseled:  Patient, Routine exercise and healthy weight maintenance discussed.    Patient Instructions:  Return to clinic in one year for next routine health visit, or sooner if problems or concerns.

## 2022-02-16 NOTE — TELEPHONE ENCOUNTER
Order faxed to [facility], patient is aware they will contact him to schedule.

## 2022-02-16 NOTE — NURSING NOTE
CAGE Assessment Entered On:  10/4/2019 12:49 PM CDT    Performed On:  10/2/2019 12:49 PM CDT by Jocelynn Quinn               Assessment   Have you ever felt you should cut down on your drinking :   No   Have people annoyed you by criticizing your drinking :   No   Have you ever felt bad or guilty about your drinking :   No   Have you ever taken a drink first thing in the morning to steady your nerves or get rid of a hangover (Eye-opener) :   No   CAGE Score :   0    Jocelynn Quinn - 10/4/2019 12:49 PM CDT

## 2022-02-16 NOTE — LETTER
(Inserted Image. Unable to display)         October 15, 2020        REJI ROMERO   [address] [place], [place] [id number]        Dear REJI,    Thank you for selecting [facility] for your healthcare needs.    Our records indicate you are due for the following services:     Annual Physical     To schedule an appointment or if you have further questions, please contact your primary clinic:   [facility]       [phone]   [facility]       [phone]              [facility]     [phone]      Powered by [employer]    Sincerely,    Cullen Johnson MD

## 2022-02-16 NOTE — NURSING NOTE
Comprehensive Intake Entered On:  11/30/2019 8:55 AM CST    Performed On:  11/30/2019 8:50 AM CST by Lu Butler CMA               Summary   Chief Complaint :   c/o sinus pressure/nasal congestion, cough with mucus    Menstrual Status :   N/A   Weight Measured :   175.4 lb(Converted to: 175 lb 6 oz, 79.56 kg)    Height Measured :   68 in(Converted to: 5 ft 8 in, 172.72 cm)    Body Mass Index :   26.67 kg/m2 (HI)    Body Surface Area :   1.95 m2   Systolic Blood Pressure :   112 mmHg   Diastolic Blood Pressure :   62 mmHg   Mean Arterial Pressure :   79 mmHg   Peripheral Pulse Rate :   64 bpm   BP Site :   Right arm   Pulse Site :   Radial artery   BP Method :   Manual   HR Method :   Manual   Temperature Tympanic :   97.9 DegF(Converted to: 36.6 DegC)    Oxygen Saturation :   98 %   Race :      Languages :   English   Ethnicity :   Not  or    Lu Butler CMA - 11/30/2019 8:50 AM CST   Health Status   Allergies Verified? :   Yes   Medication History Verified? :   Yes   Medical History Verified? :   No   Pre-Visit Planning Status :   Not completed   Tobacco Use? :   Never smoker   Lu Butler CMA - 11/30/2019 8:50 AM CST   Consents   Consent for Immunization Exchange :   Consent Granted   Consent for Immunizations to Providers :   Consent Granted   Lu Butler CMA - 11/30/2019 8:50 AM CST   Meds / Allergies   (As Of: 11/30/2019 8:55:34 AM CST)   Allergies (Active)   penicillins  Estimated Onset Date:   Unspecified ; Reactions:   hives ; Created By:   Marry Willingham CMA; Reaction Status:   Active ; Category:   Drug ; Substance:   penicillins ; Type:   Allergy ; Updated By:   Marry Willingham CMA; Reviewed Date:   11/30/2019 8:53 AM CST        Medication List   (As Of: 11/30/2019 8:55:34 AM CST)

## 2022-02-16 NOTE — PROGRESS NOTES
Chief Complaint    c/o sneezing, scratchy nose and throat with low grade fever. Symptoms started yesterday. deneis body aches fatigue, or direct contact. verbal consent given for video visit  History of Present Illness      39-year-old with video visit.  He was at home.  Patient has started with some fevers 101.  Sniffles scratchy nose and throat.  First he thought it was allergies but he has had increased temp today.  He has been good at viral pandemic precautions.  But he is ahead of [email or web address] and does come in contact with employees some people.  He has had one employee who he has having fever symptoms and had direct contact so they expect her to be positive otherwise no one of their employees have had problems and no one in his family has had symptoms  Review of Systems      No changes in taste, no shortness of breath, no rashes  Physical Exam      Alert and talkative no signs of distress  Assessment/Plan       1. Fever (R50.9)         He will be tested for the coronavirus.  I discussed false negative test and that since he has symptoms of low-grade fever he should remain in isolation until his symptoms have been clear for 3 days.  He plans on working from home this week.  Patient Information     Name:REJI ROMERO      Address:      [address] [id number]     Sex:Male     YOB: 1980     [phone]     Emergency Contact:NARCISA ROMERO     MRN:[id number]     FIN:[id number]     Location:[email or web address]     Date of Service:07/06/2020      Primary Care Physician:       Cullen Johnson MD, [phone]      Attending Physician:       Marco Barrera MD, [phone]  Problem List/Past Medical History    Ongoing     Epistaxis       Comments: Occasional epistaxis during the winter months.    Historical     Allergic eye reaction       Comments: Uncertain cause.     Benign positional vertigo     Brain concussion       Comments: Patient slipped on ice,  hit the back of his head. Suffered concussion/closed head injury. Also sprained neck.     Fracture, tibia       Comments: Nondisplaced fracture of the distal right tibia.     Right wrist sprain       Comments: Softball injury. Right wrist.  Procedure/Surgical History     Oral surgery      Comments: He was missing a couple of teeth congenitally.. Teeth implants..  Medications   No active medications  Allergies    penicillins (hives)  Social History    Smoking Status - 07/06/2020     Never smoker     Alcohol      Current, Beer (12 oz), 1-2 times per month, 2 drinks/episode average. 3 drinks/episode maximum. Ready to change: No., 10/04/2019     Employment/School      Employed, Work/School description: Banker. Highest education level: University degree(s)., 10/04/2019     Exercise      Exercise frequency: 1-2 times/week. Exercise type: Sports, Yard Activities., 10/04/2019     Home/Environment      Marital status: . Spouse/Partner name: Mariama Fischer. Living situation: Home/Independent. Injuries/Abuse/Neglect in household: No. Feels unsafe at home: No. Family/Friends available for support: Yes., 10/04/2019     Nutrition/Health      Type of diet: Regular. Wants to lose weight: No. Sleeping concerns: No. Feels highly stressed: No., 10/04/2019     Sexual      Sexually active: Yes. Identifies as male, Sexual orientation: Straight or heterosexual. History of STD: No. Contraceptive Use Details: wife has IUD. History of sexual abuse: No., 10/04/2019     Substance Abuse      Never, 08/26/2014     Tobacco      Never, 08/26/2014  Family History    Hypertension: Father.    Mother: History is negative    Brother: History is negative  Immunizations      Vaccine Date Status          influenza virus vaccine, inactivated 10/02/2019 Given          influenza virus vaccine, inactivated 10/17/2017 Given          influenza virus vaccine, inactivated 11/30/2016 Given          tetanus/diphth/pertuss (Tdap) adult/adol 08/26/2014 Given           Td 12/09/2004 Recorded          Hep B 06/28/2000 Recorded          Hep B 11/16/1999 Recorded          Hep B 09/29/1999 Recorded          MMR (measles/mumps/rubella) 04/27/1993 Recorded

## 2022-02-16 NOTE — PROGRESS NOTES
Patient:   REJI ROMERO            MRN: [id number]            FIN: [id number]               Age:   36 years     Sex:  Male     :  1980   Associated Diagnoses:   Allergic reaction   Author:   Dilia Harp      Chief Complaint   2017 8:32 AM CDT    Taking amoxicillin for 5 days. c/o rash on arms, feet, chest, back        History of Present Illness   PPC with itchy hive like rash which started yesterday and has worsened for about 12 hours then he took 25mg benadyl last night and another one this morning an ditching has improved  was treated for GABHS based on centor criteria, with amoxicillin, this finished about 5d ago.    denies swelling of mouth or tongue, no diffiuclty breathing, no wheezing  denies all other supplements, lotions, soaps, no new food.      Review of Systems   Constitutional:  Negative.    Eye:  Negative.    Ear/Nose/Mouth/Throat:  Negative.    Respiratory:  Negative.    Cardiovascular:  Negative.    Endocrine:  Negative.    Musculoskeletal:  Negative.    Integumentary:  Negative except as documented in history of present illness.    Neurologic:  Negative.    Psychiatric:  Negative.              Health Status   Allergies:    Allergic Reactions (Selected)  Severity Not Documented  Penicillins (Hives)   Medications:  (Selected)   Documented Medications  Documented  ibuprofen: ( 200 mg ), po, prn, 0 Refill(s), Type: Maintenance      Histories   Past Medical History:    Resolved  Brain concussion ([id number]): Onset on 2010 at 29 years.  Resolved.  Comments:  2012 CDT 2:50 PM CDT - Candy Cesar  Patient slipped on ice, hit the back of his head.  Suffered concussion/closed head injury.  Also sprained neck.  Right wrist sprain ([id number]): Onset on 2008 at 27 years.  Resolved.  Comments:  2012 CDT 2:52 PM CDT - Candy Cesar  Softball injury.  Right wrist.  Benign positional vertigo ([id number]): Onset on 2005 at 25 years.  Resolved.  Allergic eye reaction  ([id number]): Onset on 11/3/2003 at 23 years.  Resolved.  Comments:  7/16/2012 CDT 2:57 PM CDT - Candy Cesar  Uncertain cause.  Fracture, tibia ([id number]): Onset on 6/30/1998 at 17 years.  Resolved.  Comments:  7/16/2012 CDT 2:45 PM CDT - Candy Cesar  Nondisplaced fracture of the distal right tibia.   Family History:    Hypertension  Father (Chad)        Physical Examination   Vital Signs   5/20/2017 8:32 AM CDT Temperature Tympanic 97.4 DegF  LOW    Peripheral Pulse Rate 56 bpm  LOW    Pulse Site Radial artery    HR Method Manual    Systolic Blood Pressure 128 mmHg    Diastolic Blood Pressure 72 mmHg    Mean Arterial Pressure 91 mmHg    BP Site Right arm    BP Method Manual      General:  Alert and oriented, No acute distress.    Eye:  Pupils are equal, round and reactive to light.    HENT:  Normocephalic, Oral mucosa is moist, No pharyngeal erythema.    Neck:  Supple.    Respiratory:  Lungs are clear to auscultation, Respirations are non-labored.    Cardiovascular:  Normal rate, Regular rhythm, No edema.    Musculoskeletal:  Normal range of motion, Normal gait.    Integumentary:  Warm, Dry, Pink, urticaria diffuse across torso and extremities, no purpura  no hives on face or hands.    Neurologic:  Alert, Oriented, Normal sensory, Normal motor function, No focal deficits.    Psychiatric:  Cooperative, Appropriate mood & affect, Normal judgment.       Impression and Plan   Diagnosis     Allergic reaction ([id number] T78.40XA).     Patient Instructions:       Counseled: Patient, Regarding diagnosis, Regarding medications, Activity.    Summary:  delayed amoxicillin reaction as there have been no other variables  avoid PCNs in future  offered prednisone and explained rationale but pt would like to continue with just benadryl as this has been helpful  would also like him to be on zyrtec 10mg daily for next 30d to avoid secondary reaction and allow body to calm down  dyspnea or difficulty swallowing should go to ED.

## 2022-02-16 NOTE — NURSING NOTE
Depression Screening Entered On:  10/4/2019 12:50 PM CDT    Performed On:  10/2/2019 12:49 PM CDT by Jocelynn Quinn               Depression Screening   Little Interest - Pleasure in Activities :   Not at all   Feeling Down, Depressed, Hopeless :   Not at all   Initial Depression Screen Score :   0    Trouble Falling or Staying Asleep :   Not at all   Feeling Tired or Little Energy :   Not at all   Poor Appetite or Overeating :   Not at all   Feeling Bad About Yourself :   Not at all   Trouble Concentrating :   Not at all   Moving or Speaking Slowly :   Not at all   Thoughts Better Off Dead or Hurting Self :   Not at all   Detailed Depression Screen Score :   0    Total Depression Screen Score :   0    Jocelynn Quinn - 10/4/2019 12:49 PM CDT

## 2022-04-28 ENCOUNTER — OFFICE VISIT (OUTPATIENT)
Dept: FAMILY MEDICINE | Facility: CLINIC | Age: 42
End: 2022-04-28
Payer: COMMERCIAL

## 2022-04-28 VITALS
SYSTOLIC BLOOD PRESSURE: 123 MMHG | BODY MASS INDEX: 27.58 KG/M2 | WEIGHT: 182 LBS | HEART RATE: 58 BPM | TEMPERATURE: 97.3 F | DIASTOLIC BLOOD PRESSURE: 80 MMHG | HEIGHT: 68 IN

## 2022-04-28 DIAGNOSIS — Z00.00 WELL ADULT EXAM: Primary | ICD-10-CM

## 2022-04-28 PROCEDURE — 99395 PREV VISIT EST AGE 18-39: CPT | Performed by: FAMILY MEDICINE

## 2022-04-28 NOTE — PROGRESS NOTES
SUBJECTIVE:   CC: Curtis Fischer is an 41 year old male who presents for preventative health visit.       Patient is  his wife and 2 kids are in good health.  He works as a .  Is also involved in the school board.  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  Stays very active no health problems or concerns family history is unremarkable    Healthy Habits:     Getting at least 3 servings of Calcium per day:  Yes    Bi-annual eye exam:  Yes    Dental care twice a year:  Yes    Sleep apnea or symptoms of sleep apnea:  None    Diet:  Regular (no restrictions)    Frequency of exercise:  2-3 days/week    Duration of exercise:  15-30 minutes    Taking medications regularly:  Not Applicable    Medication side effects:  Not applicable    PHQ-2 Total Score: 0    Additional concerns today:  No          Today's PHQ-2 Score:   PHQ-2 ( 1999 [employer]) 4/28/2022   Q1: Little interest or pleasure in doing things 0   Q2: Feeling down, depressed or hopeless 0   PHQ-2 Score 0   Q1: Little interest or pleasure in doing things Not at all   Q2: Feeling down, depressed or hopeless Not at all   PHQ-2 Score 0       Abuse: Current or Past(Physical, Sexual or Emotional)- No  Do you feel safe in your environment? Yes    Have you ever done Advance Care Planning? (For example, a Health Directive, POLST, or a discussion with a medical provider or your loved ones about your wishes): Yes, patient states has an Advance Care Planning document and will bring a copy to the clinic.    Social History     Tobacco Use     Smoking status: Never Smoker     Smokeless tobacco: Never Used   Substance Use Topics     Alcohol use: Yes     Comment: 2-3 drink per week.     If you drink alcohol do you typically have >3 drinks per day or >7 drinks per week? No    Alcohol Use 4/28/2022   Prescreen: >3 drinks/day or >7 drinks/week? No       Last PSA: No results found for: PSA    Reviewed orders with patient. Reviewed health  "maintenance and updated orders accordingly - Yes      Reviewed and updated as needed this visit by clinical staff   Tobacco  Allergies  Meds                Reviewed and updated as needed this visit by Provider                       Review of Systems  CONSTITUTIONAL: NEGATIVE for fever, chills, change in weight  INTEGUMENTARY/SKIN: NEGATIVE for worrisome rashes, moles or lesions  EYES: NEGATIVE for vision changes or irritation  ENT: NEGATIVE for ear, mouth and throat problems  RESP: NEGATIVE for significant cough or SOB  CV: NEGATIVE for chest pain, palpitations or peripheral edema  GI: NEGATIVE for nausea, abdominal pain, heartburn, or change in bowel habits   male: negative for dysuria, hematuria, decreased urinary stream, erectile dysfunction, urethral discharge  MUSCULOSKELETAL: NEGATIVE for significant arthralgias or myalgia  NEURO: NEGATIVE for weakness, dizziness or paresthesias  PSYCHIATRIC: NEGATIVE for changes in mood or affect    OBJECTIVE:   /80 (BP Location: Right arm, Patient Position: Sitting, Cuff Size: Adult Large)   Pulse 58   Temp 97.3  F (36.3  C) (Tympanic)   Ht 1.727 m (5' 8\")   Wt 82.6 kg (182 lb)   BMI 27.67 kg/m      Physical Exam  GENERAL: healthy, alert and no distress  EYES: Eyes grossly normal to inspection, PERRL and conjunctivae and sclerae normal  HENT: ear canals and TM's normal, nose and mouth without ulcers or lesions  NECK: no adenopathy, no asymmetry, masses, or scars and thyroid normal to palpation  RESP: lungs clear to auscultation - no rales, rhonchi or wheezes  CV: regular rate and rhythm, normal S1 S2, no S3 or S4, no murmur, click or rub, no peripheral edema and peripheral pulses strong  ABDOMEN: soft, nontender, no hepatosplenomegaly, no masses and bowel sounds normal  MS: no gross musculoskeletal defects noted, no edema  SKIN: no suspicious lesions or rashes  NEURO: Normal strength and tone, mentation intact and speech normal  PSYCH: mentation appears " "normal, affect normal/bright        ASSESSMENT/PLAN:       ICD-10-CM    1. Well adult exam  Z00.00            COUNSELING:   Reviewed preventive health counseling, as reflected in patient instructions    Estimated body mass index is 27.67 kg/m  as calculated from the following:    Height as of this encounter: 1.727 m (5' 8\").    Weight as of this encounter: 82.6 kg (182 lb).         He reports that he has never smoked. He has never used smokeless tobacco.      Counseling Resources:  ATP IV Guidelines  Pooled Cohorts Equation Calculator  FRAX Risk Assessment  ICSI Preventive Guidelines  Dietary Guidelines for Americans, 2010  USDA's MyPlate  ASA Prophylaxis  Lung CA Screening    Cullen Johnson MD  [facility]  "

## 2022-10-03 ENCOUNTER — HEALTH MAINTENANCE LETTER (OUTPATIENT)
Age: 42
End: 2022-10-03

## 2023-06-04 ENCOUNTER — HEALTH MAINTENANCE LETTER (OUTPATIENT)
Age: 43
End: 2023-06-04

## 2023-07-18 ENCOUNTER — OFFICE VISIT (OUTPATIENT)
Dept: FAMILY MEDICINE | Facility: CLINIC | Age: 43
End: 2023-07-18
Payer: COMMERCIAL

## 2023-07-18 VITALS
SYSTOLIC BLOOD PRESSURE: 114 MMHG | WEIGHT: 181 LBS | HEART RATE: 58 BPM | TEMPERATURE: 97.8 F | RESPIRATION RATE: 16 BRPM | BODY MASS INDEX: 27.43 KG/M2 | DIASTOLIC BLOOD PRESSURE: 74 MMHG | OXYGEN SATURATION: 98 % | HEIGHT: 68 IN

## 2023-07-18 DIAGNOSIS — Z00.00 WELL ADULT EXAM: ICD-10-CM

## 2023-07-18 DIAGNOSIS — R22.31 FOREARM MASS, RIGHT: Primary | ICD-10-CM

## 2023-07-18 DIAGNOSIS — Z13.1 SCREENING FOR DIABETES MELLITUS: ICD-10-CM

## 2023-07-18 DIAGNOSIS — Z11.59 NEED FOR HEPATITIS C SCREENING TEST: ICD-10-CM

## 2023-07-18 DIAGNOSIS — Z13.220 SCREENING CHOLESTEROL LEVEL: ICD-10-CM

## 2023-07-18 DIAGNOSIS — Z11.4 SCREENING FOR HIV (HUMAN IMMUNODEFICIENCY VIRUS): ICD-10-CM

## 2023-07-18 LAB
CHOLEST SERPL-MCNC: 179 MG/DL
FASTING STATUS PATIENT QL REPORTED: YES
GLUCOSE SERPL-MCNC: 92 MG/DL (ref 70–99)
HDLC SERPL-MCNC: 49 MG/DL
LDLC SERPL CALC-MCNC: 86 MG/DL
NONHDLC SERPL-MCNC: 130 MG/DL
TRIGL SERPL-MCNC: 222 MG/DL

## 2023-07-18 PROCEDURE — 36415 COLL VENOUS BLD VENIPUNCTURE: CPT | Performed by: FAMILY MEDICINE

## 2023-07-18 PROCEDURE — 99396 PREV VISIT EST AGE 40-64: CPT | Performed by: FAMILY MEDICINE

## 2023-07-18 PROCEDURE — 82947 ASSAY GLUCOSE BLOOD QUANT: CPT | Mod: QW | Performed by: FAMILY MEDICINE

## 2023-07-18 PROCEDURE — 99213 OFFICE O/P EST LOW 20 MIN: CPT | Mod: 25 | Performed by: FAMILY MEDICINE

## 2023-07-18 PROCEDURE — 80061 LIPID PANEL: CPT | Performed by: FAMILY MEDICINE

## 2023-07-18 ASSESSMENT — ENCOUNTER SYMPTOMS
COUGH: 0
JOINT SWELLING: 0
HEMATURIA: 0
DYSURIA: 0
CHILLS: 0
SHORTNESS OF BREATH: 0
ABDOMINAL PAIN: 0
HEMATOCHEZIA: 0
EYE PAIN: 0
PALPITATIONS: 0
HEARTBURN: 0
MYALGIAS: 0
ARTHRALGIAS: 0
DIZZINESS: 0
DIARRHEA: 0
PARESTHESIAS: 0
WEAKNESS: 0
HEADACHES: 0
FREQUENCY: 0
FEVER: 0
NERVOUS/ANXIOUS: 0
NAUSEA: 0
SORE THROAT: 0
CONSTIPATION: 0

## 2023-07-18 NOTE — LETTER
July 19, 2023      Curtis Fischer   [address] [address] [address]        Dear ,    We are writing to inform you of your test results.    Your test results fall within the expected range(s) or remain unchanged from previous results.  Please continue with current treatment plan.    Resulted Orders   Lipid panel reflex to direct LDL Fasting   Result Value Ref Range    Cholesterol 179 <200 mg/dL    Triglycerides 222 (H) <150 mg/dL    Direct Measure HDL 49 >=40 mg/dL    LDL Cholesterol Calculated 86 <=100 mg/dL    Non HDL Cholesterol 130 (H) <130 mg/dL    Narrative    Cholesterol  Desirable:  <200 mg/dL    Triglycerides  Normal:  Less than 150 mg/dL  Borderline High:  150-199 mg/dL  High:  200-499 mg/dL  Very High:  Greater than or equal to 500 mg/dL    Direct Measure HDL  Female:  Greater than or equal to 50 mg/dL   Male:  Greater than or equal to 40 mg/dL    LDL Cholesterol  Desirable:  <100mg/dL  Above Desirable:  100-129 mg/dL   Borderline High:  130-159 mg/dL   High:  160-189 mg/dL   Very High:  >= 190 mg/dL    Non HDL Cholesterol  Desirable:  130 mg/dL  Above Desirable:  130-159 mg/dL  Borderline High:  160-189 mg/dL  High:  190-219 mg/dL  Very High:  Greater than or equal to 220 mg/dL   Glucose   Result Value Ref Range    Glucose 92 70 - 99 mg/dL    Patient Fasting > 8hrs? Yes        If you have any questions or concerns, please call the clinic at the number listed above.       Sincerely,      Cullen Johnson MD

## 2023-07-18 NOTE — PROGRESS NOTES
SUBJECTIVE:   CC: Curtis is an 42 year old who presents for preventative health visit.     Overall doing well.  Continues to work as a .  His wife Kristie is an marketing and advertising.  Their 2 daughters are in good health.  He had a lump in his right forearm for several years that has not changed also has 1 over his left lower back      7/18/2023     8:17 AM   Additional Questions   Roomed by Laura     Healthy Habits:     Getting at least 3 servings of Calcium per day:  Yes    Bi-annual eye exam:  Yes    Dental care twice a year:  Yes    Sleep apnea or symptoms of sleep apnea:  None    Diet:  Regular (no restrictions)    Frequency of exercise:  2-3 days/week    Duration of exercise:  15-30 minutes    Taking medications regularly:  Not Applicable    Medication side effects:  Not applicable    Additional concerns today:  No      Today's PHQ-2 Score:       7/18/2023     8:13 AM   PHQ-2 ( 1999 Pfizer)   Q1: Little interest or pleasure in doing things 0   Q2: Feeling down, depressed or hopeless 0   PHQ-2 Score 0   Q1: Little interest or pleasure in doing things Not at all   Q2: Feeling down, depressed or hopeless Not at all   PHQ-2 Score 0           Social History     Tobacco Use     Smoking status: Never     Smokeless tobacco: Never   Substance Use Topics     Alcohol use: Yes     Comment: 2-3 drink per week.             7/18/2023     8:13 AM   Alcohol Use   Prescreen: >3 drinks/day or >7 drinks/week? No       Last PSA: No results found for: PSA    Reviewed orders with patient. Reviewed health maintenance and updated orders accordingly - Yes      Reviewed and updated as needed this visit by clinical staff   Tobacco  Allergies  Meds              Reviewed and updated as needed this visit by Provider                     Review of Systems   Constitutional: Negative for chills and fever.   HENT: Negative for congestion, ear pain, hearing loss and sore throat.    Eyes: Negative for pain and visual  "disturbance.   Respiratory: Negative for cough and shortness of breath.    Cardiovascular: Negative for chest pain, palpitations and peripheral edema.   Gastrointestinal: Negative for abdominal pain, constipation, diarrhea, heartburn, hematochezia and nausea.   Genitourinary: Negative for dysuria, frequency, genital sores, hematuria, impotence, penile discharge and urgency.   Musculoskeletal: Negative for arthralgias, joint swelling and myalgias.   Skin: Negative for rash.   Neurological: Negative for dizziness, weakness, headaches and paresthesias.   Psychiatric/Behavioral: Negative for mood changes. The patient is not nervous/anxious.          OBJECTIVE:   /74 (BP Location: Right arm, Patient Position: Sitting, Cuff Size: Adult Large)   Pulse 58   Temp 97.8  F (36.6  C) (Temporal)   Resp 16   Ht 1.727 m (5' 8\")   Wt 82.1 kg (181 lb)   SpO2 98%   BMI 27.52 kg/m      Physical Exam  GENERAL: healthy, alert and no distress  EYES: Eyes grossly normal to inspection, PERRL and conjunctivae and sclerae normal  HENT: ear canals and TM's normal, nose and mouth without ulcers or lesions  NECK: no adenopathy, no asymmetry, masses, or scars and thyroid normal to palpation  RESP: lungs clear to auscultation - no rales, rhonchi or wheezes  CV: regular rate and rhythm, normal S1 S2, no S3 or S4, no murmur, click or rub, no peripheral edema and peripheral pulses strong  ABDOMEN: soft, nontender, no hepatosplenomegaly, no masses and bowel sounds normal  MS: no gross musculoskeletal defects noted, no edema  SKIN: He has a small lipoma-like 2 cm lesion in his right proximal forearm.  Also has a small subcutaneous mass over his left lower back at his waistline again palpates like a lipoma  NEURO: Normal strength and tone, mentation intact and speech normal  PSYCH: mentation appears normal, affect normal/bright        ASSESSMENT/PLAN:   (R22.31) Forearm mass, right  (primary encounter diagnosis)  Comment: We will get " ultrasound if not absolutely a lipoma we will have surgery remove it  Plan: US Upper Extremity Non Vascular Right            (Z00.00) Well adult exam  Comment: Healthcare maintenance reviewed  Plan:                 COUNSELING:   Reviewed preventive health counseling, as reflected in patient instructions        He reports that he has never smoked. He has never used smokeless tobacco.        Cullen Johnson MD  [facility]

## 2023-12-07 ENCOUNTER — MYC MEDICAL ADVICE (OUTPATIENT)
Dept: FAMILY MEDICINE | Facility: CLINIC | Age: 43
End: 2023-12-07
Payer: COMMERCIAL

## 2023-12-07 DIAGNOSIS — M25.531 PAIN IN JOINT, FOREARM, RIGHT: Primary | ICD-10-CM

## 2023-12-12 ENCOUNTER — TRANSFERRED RECORDS (OUTPATIENT)
Dept: HEALTH INFORMATION MANAGEMENT | Facility: CLINIC | Age: 43
End: 2023-12-12
Payer: COMMERCIAL

## 2024-09-18 ENCOUNTER — OFFICE VISIT (OUTPATIENT)
Dept: FAMILY MEDICINE | Facility: CLINIC | Age: 44
End: 2024-09-18
Payer: COMMERCIAL

## 2024-09-18 VITALS
HEART RATE: 78 BPM | OXYGEN SATURATION: 97 % | WEIGHT: 181.5 LBS | HEIGHT: 68 IN | RESPIRATION RATE: 16 BRPM | DIASTOLIC BLOOD PRESSURE: 80 MMHG | TEMPERATURE: 97.4 F | BODY MASS INDEX: 27.51 KG/M2 | SYSTOLIC BLOOD PRESSURE: 112 MMHG

## 2024-09-18 DIAGNOSIS — Z00.00 WELL ADULT EXAM: Primary | ICD-10-CM

## 2024-09-18 PROCEDURE — 99396 PREV VISIT EST AGE 40-64: CPT | Mod: 25 | Performed by: FAMILY MEDICINE

## 2024-09-18 PROCEDURE — 90471 IMMUNIZATION ADMIN: CPT | Performed by: FAMILY MEDICINE

## 2024-09-18 PROCEDURE — 90715 TDAP VACCINE 7 YRS/> IM: CPT | Performed by: FAMILY MEDICINE

## 2024-09-18 NOTE — PROGRESS NOTES
"Preventive Care Visit  [facility]  Cullen Johnson MD, Family Medicine  Sep 18, 2024      Assessment & Plan     Well adult exam  Care maintenance reviewed            BMI  Estimated body mass index is 27.6 kg/m  as calculated from the following:    Height as of this encounter: 1.727 m (5' 8\").    Weight as of this encounter: 82.3 kg (181 lb 8 oz).       Counseling  Appropriate preventive services were addressed with this patient via screening, questionnaire, or discussion as appropriate for fall prevention, nutrition, physical activity, Tobacco-use cessation, social engagement, weight loss and cognition.  Checklist reviewing preventive services available has been given to the patient.  Reviewed patient's diet, addressing concerns and/or questions.   He is at risk for lack of exercise and has been provided with information to increase physical activity for the benefit of his well-being.   He is at risk for psychosocial distress and has been provided with information to reduce risk.           Humberto Acevedo is a 44 year old, presenting for the following:Overall doing well.  Continues to work as a .  His wife Kristie is an marketing and advertising.  Their 2 daughters are in good health   Physical        9/18/2024     2:38 PM   Additional Questions   [facility]        Health Care Directive  Patient does not have a Health Care Directive or Living Will: Discussed advance care planning with patient; however, patient declined at this time.    HPI              9/16/2024   General Health   How would you rate your overall physical health? Good   Feel stress (tense, anxious, or unable to sleep) Only a little      (!) STRESS CONCERN      9/16/2024   Nutrition   Three or more servings of calcium each day? Yes   Diet: Regular (no restrictions)   How many servings of fruit and vegetables per day? (!) 2-3   How many sweetened beverages each day? 0-1            9/16/2024   Exercise "   Days per week of moderate/strenous exercise 2 days   Average minutes spent exercising at this level 30 min      (!) EXERCISE CONCERN      9/16/2024   Social Factors   Frequency of gathering with friends or relatives Twice a week   Worry food won't last until get money to buy more No   Food not last or not have enough money for food? No   Do you have housing? (Housing is defined as stable permanent housing and does not include staying ouside in a car, in a tent, in an abandoned building, in an overnight shelter, or couch-surfing.) Yes   Are you worried about losing your housing? No   Lack of transportation? No   Unable to get utilities (heat,electricity)? No            9/16/2024   Dental   Dentist two times every year? Yes            9/16/2024   TB Screening   Were you born outside of the [place]? No            Today's PHQ-2 Score:       9/18/2024     2:38 PM   PHQ-2 ( 1999 Pfizer)   Q1: Little interest or pleasure in doing things 0   Q2: Feeling down, depressed or hopeless 0   PHQ-2 Score 0   Q1: Little interest or pleasure in doing things Not at all   Q2: Feeling down, depressed or hopeless Not at all   PHQ-2 Score 0           9/16/2024   Substance Use   Alcohol more than 3/day or more than 7/wk No   Do you use any other substances recreationally? No        Social History     Tobacco Use    Smoking status: Never    Smokeless tobacco: Never   Vaping Use    Vaping status: Never Used   Substance Use Topics    Alcohol use: Yes     Comment: 2-3 drink per week.    Drug use: Never             9/16/2024   One time HIV Screening   Previous HIV test? No          9/16/2024   STI Screening   New sexual partner(s) since last STI/HIV test? No      ASCVD Risk   The 10-year ASCVD risk score (Nicola JEFFREY, et al., 2019) is: 0.9%    Values used to calculate the score:      Age: 44 years      Sex: Male      Is Non- : No      Diabetic: No      Tobacco smoker: No      Systolic Blood Pressure: 112 mmHg      Is  "BP treated: No      HDL Cholesterol: 49 mg/dL      Total Cholesterol: 179 mg/dL        9/16/2024   Contraception/Family Planning   Questions about contraception or family planning No           Reviewed and updated as needed this visit by Provider                          Review of Systems  Constitutional, HEENT, cardiovascular, pulmonary, GI, , musculoskeletal, neuro, skin, endocrine and psych systems are negative, except as otherwise noted.     Objective    Exam  /80 (BP Location: Right arm, Patient Position: Sitting, Cuff Size: Adult Large)   Pulse 78   Temp 97.4  F (36.3  C) (Tympanic)   Resp 16   Ht 1.727 m (5' 8\")   Wt 82.3 kg (181 lb 8 oz)   SpO2 97%   BMI 27.60 kg/m     Estimated body mass index is 27.6 kg/m  as calculated from the following:    Height as of this encounter: 1.727 m (5' 8\").    Weight as of this encounter: 82.3 kg (181 lb 8 oz).    Physical Exam  GENERAL: alert and no distress  EYES: Eyes grossly normal to inspection, PERRL and conjunctivae and sclerae normal  HENT: ear canals and TM's normal, nose and mouth without ulcers or lesions  NECK: no adenopathy, no asymmetry, masses, or scars  RESP: lungs clear to auscultation - no rales, rhonchi or wheezes  CV: regular rate and rhythm, normal S1 S2, no S3 or S4, no murmur, click or rub, no peripheral edema  ABDOMEN: soft, nontender, no hepatosplenomegaly, no masses and bowel sounds normal  MS: no gross musculoskeletal defects noted, no edema  SKIN: no suspicious lesions or rashes  NEURO: Normal strength and tone, mentation intact and speech normal  PSYCH: mentation appears normal, affect normal/bright        Signed Electronically by: Cullen Johnson MD    "